# Patient Record
Sex: FEMALE
[De-identification: names, ages, dates, MRNs, and addresses within clinical notes are randomized per-mention and may not be internally consistent; named-entity substitution may affect disease eponyms.]

---

## 2024-08-04 ENCOUNTER — NON-APPOINTMENT (OUTPATIENT)
Age: 59
End: 2024-08-04

## 2024-08-13 ENCOUNTER — APPOINTMENT (OUTPATIENT)
Dept: ORTHOPEDIC SURGERY | Facility: CLINIC | Age: 59
End: 2024-08-13
Payer: COMMERCIAL

## 2024-08-13 DIAGNOSIS — M17.12 UNILATERAL PRIMARY OSTEOARTHRITIS, LEFT KNEE: ICD-10-CM

## 2024-08-13 PROBLEM — Z00.00 ENCOUNTER FOR PREVENTIVE HEALTH EXAMINATION: Status: ACTIVE | Noted: 2024-08-13

## 2024-08-13 PROCEDURE — 73562 X-RAY EXAM OF KNEE 3: CPT | Mod: LT

## 2024-08-13 PROCEDURE — 99204 OFFICE O/P NEW MOD 45 MIN: CPT | Mod: 25

## 2024-08-13 NOTE — HISTORY OF PRESENT ILLNESS
[de-identified] : Initial Visit: left knee pain  Reason:  no falls or injuries  Duration: 3 weeks  Prior studies: x-ray order  Surgical Hx:  uterus and gallbladder  Medical Hx: hypertension, asthma , hearing problems on the right ear  Aggravating Fx: sitting/standing /moving  Pain: 8/10 Pain Med: Tylenol/ dalivium  Allergies: in the med allergy section

## 2024-08-13 NOTE — ASSESSMENT
[FreeTextEntry1] : Discussed at length with patient regarding symptoms and diagnosis.  Treatment options discussed and patient's questions answered and patient expresses understanding.  Patient offered cortisone injection, but declines at this time.  Patient offered physical therapy and home exercises.  Patient to follow-up in office if persistent or recurrent symptoms. Patient instructed to call if any questions or concerns.

## 2024-08-13 NOTE — HISTORY OF PRESENT ILLNESS
[de-identified] : Initial Visit: left knee pain  Reason:  no falls or injuries  Duration: 3 weeks  Prior studies: x-ray order  Surgical Hx:  uterus and gallbladder  Medical Hx: hypertension, asthma , hearing problems on the right ear  Aggravating Fx: sitting/standing /moving  Pain: 8/10 Pain Med: Tylenol/ dalivium  Allergies: in the med allergy section

## 2024-08-13 NOTE — PHYSICAL EXAM
[de-identified] : Left knee  Constitutional:  The patient is healthy-appearing and in no apparent distress.   Gait: The patient ambulates with a normal gait and no limp.  Cardiovascular System:  The capillary refill is less than 2 seconds.   Skin:  There are no skin abnormalities.  Left Knee:   Bony Palpation:  There is no tenderness of the medial joint line.  There is no tenderness of the lateral joint line. There is tenderness of the medial femoral chondyle. There is no tenderness of the lateral femoral chondyle. There is no tenderness of the tibial tubercle. There is no tenderness of the superior patella. There is no tenderness of the inferior patella. There is tenderness of the medial patellar facet. There is tenderness of the lateral patellar facet.  Soft Tissue Palpation:  There is tenderness of the medial retinaculum. There is tenderness of the lateral retinaculum. There is no tenderness of the quadriceps tendon. There is no tenderness of the patella tendon. There is no tenderness of the ITB. There is no tenderness of the pes anserine.  Active Range of Motion:  The range of motion at the knee actively and passively is full.   Special Tests:  There is a negative Apley. There is a negative Steinmanns.  There is a negative Lachman and Anterior Drawer. There is a negative Posterior Drawer.   There is no varus or valgus laxity.  Strength:  There is 5/5 hip flexion and 5/5 knee flexion and extension.    Psychiatric:  The patient demonstrates a normal mood and affect and is active and alert  [de-identified] : X-sukh left knee:  There is mild medial and moderate PF arthritis

## 2024-08-13 NOTE — PHYSICAL EXAM
[de-identified] : Left knee  Constitutional:  The patient is healthy-appearing and in no apparent distress.   Gait: The patient ambulates with a normal gait and no limp.  Cardiovascular System:  The capillary refill is less than 2 seconds.   Skin:  There are no skin abnormalities.  Left Knee:   Bony Palpation:  There is no tenderness of the medial joint line.  There is no tenderness of the lateral joint line. There is tenderness of the medial femoral chondyle. There is no tenderness of the lateral femoral chondyle. There is no tenderness of the tibial tubercle. There is no tenderness of the superior patella. There is no tenderness of the inferior patella. There is tenderness of the medial patellar facet. There is tenderness of the lateral patellar facet.  Soft Tissue Palpation:  There is tenderness of the medial retinaculum. There is tenderness of the lateral retinaculum. There is no tenderness of the quadriceps tendon. There is no tenderness of the patella tendon. There is no tenderness of the ITB. There is no tenderness of the pes anserine.  Active Range of Motion:  The range of motion at the knee actively and passively is full.   Special Tests:  There is a negative Apley. There is a negative Steinmanns.  There is a negative Lachman and Anterior Drawer. There is a negative Posterior Drawer.   There is no varus or valgus laxity.  Strength:  There is 5/5 hip flexion and 5/5 knee flexion and extension.    Psychiatric:  The patient demonstrates a normal mood and affect and is active and alert  [de-identified] : X-sukh left knee:  There is mild medial and moderate PF arthritis

## 2024-10-02 ENCOUNTER — APPOINTMENT (OUTPATIENT)
Dept: PEDIATRIC ALLERGY IMMUNOLOGY | Facility: CLINIC | Age: 59
End: 2024-10-02
Payer: COMMERCIAL

## 2024-10-02 DIAGNOSIS — Z91.018 ALLERGY TO OTHER FOODS: ICD-10-CM

## 2024-10-02 DIAGNOSIS — Z88.9 ALLERGY STATUS TO UNSPECIFIED DRUGS, MEDICAMENTS AND BIOLOGICAL SUBSTANCES: ICD-10-CM

## 2024-10-02 DIAGNOSIS — J30.9 ALLERGIC RHINITIS, UNSPECIFIED: ICD-10-CM

## 2024-10-02 DIAGNOSIS — J45.30 MILD PERSISTENT ASTHMA, UNCOMPLICATED: ICD-10-CM

## 2024-10-02 DIAGNOSIS — J45.20 MILD INTERMITTENT ASTHMA, UNCOMPLICATED: ICD-10-CM

## 2024-10-02 DIAGNOSIS — Z87.09 PERSONAL HISTORY OF OTHER DISEASES OF THE RESPIRATORY SYSTEM: ICD-10-CM

## 2024-10-02 PROCEDURE — 99204 OFFICE O/P NEW MOD 45 MIN: CPT

## 2024-10-02 NOTE — REASON FOR VISIT
[Initial Consultation] : an initial consultation for [Allergy Evaluation/ Skin Testing] : allergy evaluation and or skin testing [To Medication] : allergy to medication [Family Member] : family member

## 2024-10-02 NOTE — HISTORY OF PRESENT ILLNESS
[Eczematous rashes] : eczematous rashes [Venom Reactions] : venom reactions [de-identified] : 59 year old woman who presents for multiple drug allergies.  She presents with her son.  Patient is from [facility] and has her drug allergy medication list with her.  She is supposed to get knee injection (kenalog) but the orthopedic surgeon wants clarification of what medications she is truly allergic to.  The history is a bit complex from the son but states she has a significant allergic reaction history.  She was told that she should be tested to any new medications prior to be given them.    She tolerates betamethasone.  She tolerates epinephrine.    Allergic reactions to medications: Antibiotics - aminoglycosides, ciprofloxacin caused anaphylaxis trouble breathing, LOC Aspirin, Tramadol - anaphylactic shock (facial swelling, throat closing, can't breath, loss of consciousness) - tramadol rxn occured during a hysterectomy As per documentation from doctor in [facility] - she can not take ana anesthetic medications but she can take amide group anesthetics only bupivacaine and prilocaine (she does not tolerate lidocaine or mepivacaine);  this is based on testing that was performed in [facility].  Asthma - mild persistent; she takes atrovent inhaler and singulair daily Allergic rhinitis - yes, to pollens and cats/dogs;  daily loratadine and flonase daily and allergy eye drops Food allergies - she avoids shellfish, pineapple, tomato, kiwi, red beans (tolerates lentils)

## 2024-10-02 NOTE — REVIEW OF SYSTEMS
[Eye Discharge] : eye discharge [Eye Itching] : itchy eyes [Nasal Congestion] : nasal congestion [Difficulty Breathing] : dyspnea [Difficulty Swallowing] : dysphagia [Fainting (Syncope)] : fainting [Urticaria] : urticaria [Swelling] : swelling [Nl] : Cardiovascular

## 2024-10-09 RX ORDER — EPINEPHRINE 0.3 MG/.3ML
0.3 INJECTION INTRAMUSCULAR
Qty: 2 | Refills: 3 | Status: ACTIVE | COMMUNITY
Start: 2024-10-02 | End: 1900-01-01

## 2024-11-29 ENCOUNTER — NON-APPOINTMENT (OUTPATIENT)
Age: 59
End: 2024-11-29